# Patient Record
Sex: MALE | Race: WHITE | NOT HISPANIC OR LATINO
[De-identification: names, ages, dates, MRNs, and addresses within clinical notes are randomized per-mention and may not be internally consistent; named-entity substitution may affect disease eponyms.]

---

## 2023-03-26 ENCOUNTER — FORM ENCOUNTER (OUTPATIENT)
Age: 23
End: 2023-03-26

## 2023-03-27 ENCOUNTER — APPOINTMENT (OUTPATIENT)
Dept: MRI IMAGING | Facility: CLINIC | Age: 23
End: 2023-03-27
Payer: COMMERCIAL

## 2023-03-27 ENCOUNTER — APPOINTMENT (OUTPATIENT)
Dept: ORTHOPEDIC SURGERY | Facility: CLINIC | Age: 23
End: 2023-03-27
Payer: COMMERCIAL

## 2023-03-27 VITALS — HEIGHT: 67 IN | BODY MASS INDEX: 31.39 KG/M2 | WEIGHT: 200 LBS

## 2023-03-27 DIAGNOSIS — Z00.00 ENCOUNTER FOR GENERAL ADULT MEDICAL EXAMINATION W/OUT ABNORMAL FINDINGS: ICD-10-CM

## 2023-03-27 DIAGNOSIS — M23.92 UNSPECIFIED INTERNAL DERANGEMENT OF LEFT KNEE: ICD-10-CM

## 2023-03-27 PROCEDURE — 99203 OFFICE O/P NEW LOW 30 MIN: CPT

## 2023-03-27 PROCEDURE — L1833: CPT | Mod: LT

## 2023-03-27 PROCEDURE — L2397: CPT | Mod: LT

## 2023-03-27 PROCEDURE — 73721 MRI JNT OF LWR EXTRE W/O DYE: CPT | Mod: LT

## 2023-03-27 PROCEDURE — 73562 X-RAY EXAM OF KNEE 3: CPT | Mod: LT

## 2023-03-27 NOTE — DISCUSSION/SUMMARY
[de-identified] : 22M here with left knee internal derangement, s/s lateral meniscus tear\par 1) recommend STAT mri L knee to eval further\par 2) cryo, nsaids\par 3) playmaker brace\par 4) fu for mri review

## 2023-03-27 NOTE — PHYSICAL EXAM
[Left] : left knee [NL (0)] : extension 0 degrees [5___] : hamstring 5[unfilled]/5 [Positive] : positive Nargis [] : light touch is intact throughout [TWNoteComboBox7] : flexion 135 degrees

## 2023-03-27 NOTE — HISTORY OF PRESENT ILLNESS
[5] : 5 [1] : 2 [Dull/Aching] : dull/aching [Sharp] : sharp [Throbbing] : throbbing [Tightness] : tightness [Intermittent] : intermittent [Nothing helps with pain getting better] : Nothing helps with pain getting better [de-identified] : 22M here with acute left knee pain since 3/16. He was dancing at a bar in [place] when he felt his knee pop. He states he had immediate pain and swelling. Was unable to ambulate. He was taken to  and prescribed Mobic.  [] : no [FreeTextEntry1] : LT knee [FreeTextEntry5] : SPENSER 22 year old M here for LT knee, onset pain since 03/16/23, pt was clubbing and his knee popped \par he was unable to walk \par pt went to an urgent care in [place] and completed x ray \par pt was prescribed meloxicam \par pt is currently  [de-identified] : 03/16/23 [de-identified] : urgent care [place]  [de-identified] :  x ray

## 2023-03-27 NOTE — IMAGING
[Left] : left knee [AP] : anteroposterior [Lateral] : lateral [Newberg] : skyline [AP Standing] : anteroposterior standing [There are no fractures, subluxations or dislocations. No significant abnormalities are seen] : There are no fractures, subluxations or dislocations. No significant abnormalities are seen

## 2023-03-28 ENCOUNTER — APPOINTMENT (OUTPATIENT)
Dept: ORTHOPEDIC SURGERY | Facility: CLINIC | Age: 23
End: 2023-03-28
Payer: COMMERCIAL

## 2023-03-28 PROCEDURE — 99214 OFFICE O/P EST MOD 30 MIN: CPT

## 2023-03-28 NOTE — PHYSICAL EXAM
[Left] : left knee [NL (0)] : extension 0 degrees [5___] : hamstring 5[unfilled]/5 [Positive] : positive Nargis [] : negative Lachmann [TWNoteComboBox7] : flexion 135 degrees

## 2023-03-28 NOTE — HISTORY OF PRESENT ILLNESS
[5] : 5 [1] : 2 [Dull/Aching] : dull/aching [Sharp] : sharp [Throbbing] : throbbing [Tightness] : tightness [Intermittent] : intermittent [Nothing helps with pain getting better] : Nothing helps with pain getting better [de-identified] : 03/28/23: Here to f/up left knee and review MRI\par 3/27/23: 22M here with acute left knee pain since 3/16. He was dancing at a bar in [place] when he felt his knee pop. He states he had immediate pain and swelling. Was unable to ambulate. He was taken to  and prescribed Mobic.  [] : no [FreeTextEntry1] : LT knee [FreeTextEntry5] : SPENSER 22 year old M here for LT knee, onset pain since 03/16/23, pt was clubbing and his knee popped \par he was unable to walk \par pt went to an urgent care in [place] and completed x ray \par pt was prescribed meloxicam \par pt is currently  [de-identified] : 03/16/23 [de-identified] : urgent care [place]  [de-identified] :  x ray  [de-identified] : MRI

## 2023-03-28 NOTE — DISCUSSION/SUMMARY
[de-identified] : 22m with high-grade proximal tear of the ACL of the left knee. r/b/a of surgical intervention and conservative management discussed. pt given to opportunity to ask all questions and all questions were answered.   Pt would like to proceed with surgery as discussed.\par \par Pt is traveling and will schedule for when he returns in June. \par Will plan for left knee ACL reconstruction. \par \par Continue with playmaker brace. cryotherapy, rest and activity modification \par Pt will f/up in 6 weeks, will further discuss surgery. \par \par Entered by Carmen Horn acting as scribe.\par Dr. Callejas-\par The documentation recorded by the scribe accurately reflects the service I personally performed and the decisions made by me.

## 2023-03-28 NOTE — DATA REVIEWED
[MRI] : MRI [Left] : left [Knee] : knee [Report was reviewed and noted in the chart] : The report was reviewed and noted in the chart [I independently reviewed and interpreted images and report] : I independently reviewed and interpreted images and report [I reviewed the films/CD] : I reviewed the films/CD [FreeTextEntry1] : 03.27.23\par 1. High-grade proximal ACL tear with severe thinning and attenuation proximally and laxity of [id number] \par fibers with severe surrounding synovitis at the femoral attachment.\par 2. Moderate effusion, synovitis and medial synovial plica with slight proximal MCL sprain and slight posterior lateral soft tissue swelling.\par 3. Discoid lateral meniscus without tear.\par 4. No acute osseous injury. Specifically, there are no lateral bone contusions to clearly indicate recent anterior tibial translation episode.\par 5. Clinical correlation regarding anterior instability on physical exam is recommended.

## 2023-03-28 NOTE — IMAGING
[Left] : left knee [AP] : anteroposterior [Lateral] : lateral [Frankewing] : skyline [AP Standing] : anteroposterior standing [There are no fractures, subluxations or dislocations. No significant abnormalities are seen] : There are no fractures, subluxations or dislocations. No significant abnormalities are seen No

## 2023-03-29 ENCOUNTER — APPOINTMENT (OUTPATIENT)
Dept: ORTHOPEDIC SURGERY | Facility: CLINIC | Age: 23
End: 2023-03-29

## 2023-05-12 ENCOUNTER — APPOINTMENT (OUTPATIENT)
Dept: ORTHOPEDIC SURGERY | Facility: CLINIC | Age: 23
End: 2023-05-12
Payer: COMMERCIAL

## 2023-05-12 VITALS — WEIGHT: 200 LBS | BODY MASS INDEX: 31.39 KG/M2 | HEIGHT: 67 IN

## 2023-05-12 PROCEDURE — 99214 OFFICE O/P EST MOD 30 MIN: CPT

## 2023-05-12 NOTE — HISTORY OF PRESENT ILLNESS
[5] : 5 [1] : 2 [Dull/Aching] : dull/aching [Sharp] : sharp [Throbbing] : throbbing [Tightness] : tightness [Intermittent] : intermittent [Nothing helps with pain getting better] : Nothing helps with pain getting better [de-identified] : 5/12/23: Here to f/up left knee ACL tear. Reports episodes of increased knee pain and "popping" and instability. \par 03/28/23: Here to f/up left knee and review MRI\par 3/27/23: 22M here with acute left knee pain since 3/16. He was dancing at a bar in [place] when he felt his knee pop. He states he had immediate pain and swelling. Was unable to ambulate. He was taken to  and prescribed Mobic.  [] : no [FreeTextEntry1] : LT knee [FreeTextEntry5] : Pt feeling improvement in the Lt Knee. Pt using play maker for support. ROM Improving. Walking Improving.  [de-identified] : 03/16/23 [de-identified] : urgent care [place]  [de-identified] :  x ray

## 2023-05-12 NOTE — PHYSICAL EXAM
[Left] : left knee [NL (0)] : extension 0 degrees [5___] : hamstring 5[unfilled]/5 [Positive] : positive Nargis [] : patient ambulates without assistive device [TWNoteComboBox7] : flexion 135 degrees

## 2023-05-12 NOTE — DISCUSSION/SUMMARY
[de-identified] : 22m with high-grade proximal tear of the ACL of the left knee. r/b/a of surgical intervention and conservative management discussed. pt given to opportunity to ask all questions and all questions were answered.   Pt would like to proceed with surgery as discussed.\par \par Pt is traveling and is scheduled for when he returns in June. \par Will plan for left knee ACL reconstruction, btb autograft.\par \par Continue with playmaker brace. cryotherapy, rest and activity modification \par Will f/up prior to surgery and will get him lluvia brace for post-op.\par \par The patient was advised of the diagnosis. The natural history of the pathology was explained in full to the patient in layman's terms. All questions were answered. The risks and benefits of surgical and non-surgical treatment alternatives were explained in full to the patient. \par The patient demonstrated a full understanding of the surgical and non-surgical options. The risks of surgery were outlined in full to the patient including but not limited to bleeding, scarring, infection, sepsis, neurologic injury, vascular injury, failure to resolve symptoms, symptom recurrence, the need for further surgery, non-healing, wound breakdown, deep vein thrombosis, pulmonary embolism, spontaneous osteonecrosis, anesthesia complications and even death. The patient understood all the risks and accepted them and understood that other complications could occur that are not mentioned above. The intraoperative plan, post-operative plan, post-operative expectations and limitations were explained in full. Expectations from non-surgical treatment were explained in full as well. The patient demonstrated a complete understanding of the treatment alternatives and requested the above-mentioned procedure. This will be scheduled accordingly\par  \par The patient was advised of the diagnosis. The natural history of the pathology was explained in full to the patient in layman's terms. All questions were answered. The risks and benefits of surgical and non-surgical treatment alternatives were explained in full to the patient. \par The patient demonstrated a full understanding of the surgical and non-surgical options. The risks of surgery were outlined in full to the patient including but not limited to bleeding, scarring, infection, sepsis, neurologic injury, vascular injury, failure to resolve symptoms, symptom recurrence, the need for further surgery, non-healing, wound breakdown, deep vein thrombosis, pulmonary embolism, spontaneous osteonecrosis, anesthesia complications and even death. \par More specifically, we discussed that there could be injury to the infrapatellar branch of the saphenous nerve, causing permanent numbness near the incision, re-rupture of the ACL, motion loss, persistent pain even after surgery, among other risks. \par The patient understood all the risks and accepted them and understood that other complications could occur that are not mentioned above. The intraoperative plan, post-operative plan, post-operative expectations and limitations were explained in full. Expectations from non-surgical treatment were explained in full as well. The patient demonstrated a complete understanding of the treatment alternatives and requested the above-mentioned procedure. This will be scheduled accordingly.\par  \par Entered by Carmen Horn acting as scribe.\par Dr. Callejas-\par The documentation recorded by the scribe accurately reflects the service I personally performed and the decisions made by me.

## 2023-06-22 ENCOUNTER — APPOINTMENT (OUTPATIENT)
Dept: ORTHOPEDIC SURGERY | Facility: CLINIC | Age: 23
End: 2023-06-22
Payer: COMMERCIAL

## 2023-06-22 VITALS — WEIGHT: 200 LBS | HEIGHT: 67 IN | BODY MASS INDEX: 31.39 KG/M2

## 2023-06-22 PROCEDURE — L1833: CPT | Mod: LT

## 2023-06-22 PROCEDURE — 99214 OFFICE O/P EST MOD 30 MIN: CPT

## 2023-06-22 NOTE — DISCUSSION/SUMMARY
[de-identified] : 22m with high-grade proximal tear of the ACL of the left knee. Now scheduled for left knee ACL recon 06.26.23. All questions answered. \par [employer] Brace was appropriately fitted and dispensed. Pt left the office with brace as stated above. \par rtc as scheduled post-op

## 2023-06-22 NOTE — PHYSICAL EXAM
[Left] : left knee [NL (0)] : extension 0 degrees [5___] : hamstring 5[unfilled]/5 [Positive] : positive Nargis [] : negative Valgus instability [TWNoteComboBox7] : flexion 125 degrees

## 2023-06-22 NOTE — HISTORY OF PRESENT ILLNESS
[1] : 2 [Dull/Aching] : dull/aching [Sharp] : sharp [Throbbing] : throbbing [Tightness] : tightness [Intermittent] : intermittent [Nothing helps with pain getting better] : Nothing helps with pain getting better [3] : 3 [de-identified] : 6/22/23: Here to f/up left knee. Now scheduled for ACL recon on 06.26.23. Reports continued left knee pain and instability. \par 5/12/23: Here to f/up left knee ACL tear. Reports episodes of increased knee pain and "popping" and instability. \par 03/28/23: Here to f/up left knee and review MRI\par 3/27/23: 22M here with acute left knee pain since 3/16. He was dancing at a bar in [place] when he felt his knee pop. He states he had immediate pain and swelling. Was unable to ambulate. He was taken to  and prescribed Mobic.  [] : no [FreeTextEntry1] : LT knee [FreeTextEntry5] : Pt feeling improvement in the Lt Knee. Pt using play maker for support. ROM Improving. Walking Improving.  [de-identified] : 03/16/23 [de-identified] : urgent care [place]  [de-identified] :  x ray

## 2023-06-26 ENCOUNTER — APPOINTMENT (OUTPATIENT)
Dept: ORTHOPEDIC SURGERY | Facility: AMBULATORY SURGERY CENTER | Age: 23
End: 2023-06-26
Payer: COMMERCIAL

## 2023-06-26 DIAGNOSIS — S83.512A SPRAIN OF ANTERIOR CRUCIATE LIGAMENT OF LEFT KNEE, INITIAL ENCOUNTER: ICD-10-CM

## 2023-06-26 PROCEDURE — 29888 ARTHRS AID ACL RPR/AGMNTJ: CPT | Mod: LT

## 2023-06-26 PROCEDURE — 20902 REMOVAL OF BONE FOR GRAFT: CPT | Mod: 59,LT

## 2023-06-26 PROCEDURE — 29881 ARTHRS KNE SRG MNISECTMY M/L: CPT | Mod: AS,59,LT

## 2023-06-26 PROCEDURE — 20902 REMOVAL OF BONE FOR GRAFT: CPT | Mod: AS,59,LT

## 2023-06-26 PROCEDURE — 29881 ARTHRS KNE SRG MNISECTMY M/L: CPT | Mod: 59,LT

## 2023-06-26 PROCEDURE — 29888 ARTHRS AID ACL RPR/AGMNTJ: CPT | Mod: AS,LT

## 2023-06-26 RX ORDER — OXYCODONE 5 MG/1
5 TABLET ORAL
Qty: 48 | Refills: 0 | Status: ACTIVE | COMMUNITY
Start: 2023-06-26 | End: 1900-01-01

## 2023-06-26 RX ORDER — ASPIRIN 325 MG/1
325 TABLET, FILM COATED ORAL DAILY
Qty: 14 | Refills: 0 | Status: ACTIVE | COMMUNITY
Start: 2023-06-26 | End: 1900-01-01

## 2023-06-30 ENCOUNTER — APPOINTMENT (OUTPATIENT)
Dept: ORTHOPEDIC SURGERY | Facility: CLINIC | Age: 23
End: 2023-06-30
Payer: COMMERCIAL

## 2023-06-30 PROCEDURE — 99024 POSTOP FOLLOW-UP VISIT: CPT

## 2023-06-30 NOTE — HISTORY OF PRESENT ILLNESS
[de-identified] : dos: 06.26.23\par 06/30/2023: Pt here for pov1 s/p left knee ACL recon. Doing well. Denies f/c/s. \par \par 6/22/23: Here to f/up left knee. Now scheduled for ACL recon on 06.26.23. Reports continued left knee pain and instability. \par 5/12/23: Here to f/up left knee ACL tear. Reports episodes of increased knee pain and "popping" and instability. \par 03/28/23: Here to f/up left knee and review MRI\par 3/27/23: 22M here with acute left knee pain since 3/16. He was dancing at a bar in [place] when he felt his knee pop. He states he had immediate pain and swelling. Was unable to ambulate. He was taken to  and prescribed Mobic.  [FreeTextEntry1] : Lt Knee [FreeTextEntry5] : PO 1: Lt Knee.

## 2023-06-30 NOTE — DISCUSSION/SUMMARY
[de-identified] : 23m s/p left knee acl recon 06.26.23\par 1) start physical therapy - protocol provided\par 2) reviewed post-op precautions and procedures.\par 3) cryotherapy, rest and activity modification\par 4) c/w lluvia brace, locked in extension - WBAT\par 5) rtc 1 week \par \par Entered by Carmen Horn acting as scribe.\par Dr. Callejas-\par The documentation recorded by the scribe accurately reflects the service I personally performed and the decisions made by me.

## 2023-07-06 ENCOUNTER — APPOINTMENT (OUTPATIENT)
Dept: ORTHOPEDIC SURGERY | Facility: CLINIC | Age: 23
End: 2023-07-06
Payer: COMMERCIAL

## 2023-07-06 VITALS — HEIGHT: 67 IN | BODY MASS INDEX: 31.39 KG/M2 | WEIGHT: 200 LBS

## 2023-07-06 PROCEDURE — 99024 POSTOP FOLLOW-UP VISIT: CPT

## 2023-07-06 NOTE — HISTORY OF PRESENT ILLNESS
[de-identified] : dos: 06.26.23\par 7/6/23: Here for pov2 s/p left knee acl recon. Attending PT. \par 06/30/2023: Pt here for pov1 s/p left knee ACL recon. Doing well. Denies f/c/s. \par \par 6/22/23: Here to f/up left knee. Now scheduled for ACL recon on 06.26.23. Reports continued left knee pain and instability. \par 5/12/23: Here to f/up left knee ACL tear. Reports episodes of increased knee pain and "popping" and instability. \par 03/28/23: Here to f/up left knee and review MRI\par 3/27/23: 22M here with acute left knee pain since 3/16. He was dancing at a bar in [place] when he felt his knee pop. He states he had immediate pain and swelling. Was unable to ambulate. He was taken to  and prescribed Mobic.

## 2023-07-06 NOTE — DISCUSSION/SUMMARY
[de-identified] : 23m s/p left knee acl recon 06.26.23. not yet able to SLR on exam. \par 1) c/w physical therapy - protocol provided\par 2) reviewed post-op precautions and procedures.\par 3) cryotherapy, rest and activity modification\par 4) c/w lluvia brace, locked in extension - WBAT\par 5) rtc 1 week \par \par Entered by Carmen Horn acting as scribe.\par Dr. Callejas-\par The documentation recorded by the scribe accurately reflects the service I personally performed and the decisions made by me.

## 2023-07-13 ENCOUNTER — APPOINTMENT (OUTPATIENT)
Dept: ORTHOPEDIC SURGERY | Facility: CLINIC | Age: 23
End: 2023-07-13
Payer: COMMERCIAL

## 2023-07-13 VITALS — WEIGHT: 200 LBS | HEIGHT: 67 IN | BODY MASS INDEX: 31.39 KG/M2

## 2023-07-13 PROCEDURE — 99024 POSTOP FOLLOW-UP VISIT: CPT

## 2023-07-13 NOTE — PHYSICAL EXAM
[Left] : left knee [NL (0)] : extension 0 degrees [] : patient ambulates without assistive device [TWNoteComboBox7] : flexion 90 degrees

## 2023-07-13 NOTE — HISTORY OF PRESENT ILLNESS
[1] : 2 [de-identified] : dos: 06.26.23\par 7/13/23: Here for pov3 s/p left knee acl recon. Attending PT. Compliant with brace\par 7/6/23: Here for pov2 s/p left knee acl recon. Attending PT. \par 06/30/2023: Pt here for pov1 s/p left knee ACL recon. Doing well. Denies f/c/s. \par \par 6/22/23: Here to f/up left knee. Now scheduled for ACL recon on 06.26.23. Reports continued left knee pain and instability. \par 5/12/23: Here to f/up left knee ACL tear. Reports episodes of increased knee pain and "popping" and instability. \par 03/28/23: Here to f/up left knee and review MRI\par 3/27/23: 22M here with acute left knee pain since 3/16. He was dancing at a bar in [place] when he felt his knee pop. He states he had immediate pain and swelling. Was unable to ambulate. He was taken to  and prescribed Mobic.  [FreeTextEntry1] : Lt Knee [FreeTextEntry5] : Pt here for PO 3 Lt Knee. Pt states pain is minimal. Pt going to physical therapy 3 times a week. Walking Improving. Pt using [place]air brace.  [de-identified] : Physical Therapy

## 2023-07-13 NOTE — DISCUSSION/SUMMARY
[de-identified] : 23m s/p left knee acl recon 06.26.23. \par 1) c/w physical therapy - protocol provided\par 2) reviewed post-op precautions and procedures.\par 3) cryotherapy, rest and activity modification\par 4) c/w lluvia brace, unlocked- WBAT, can take off to sleep\par 5) rtc 3 weeks\par \par Entered by Carmen Horn acting as scribe.\par Dr. Callejas-\par The documentation recorded by the scribe accurately reflects the service I personally performed and the decisions made by me.

## 2023-08-03 ENCOUNTER — APPOINTMENT (OUTPATIENT)
Dept: ORTHOPEDIC SURGERY | Facility: CLINIC | Age: 23
End: 2023-08-03
Payer: COMMERCIAL

## 2023-08-03 VITALS — BODY MASS INDEX: 31.39 KG/M2 | HEIGHT: 67 IN | WEIGHT: 200 LBS

## 2023-08-03 PROCEDURE — 99024 POSTOP FOLLOW-UP VISIT: CPT

## 2023-08-03 NOTE — RETURN TO WORK/SCHOOL
[FreeTextEntry1] : Pt would benefit from a closer parking space for school to reduce his required walking distance.

## 2023-08-03 NOTE — DISCUSSION/SUMMARY
[de-identified] : 23m s/p left knee acl recon 06.26.23.  1) c/w physical therapy and hep  2) reviewed post-op precautions and procedures. 3) cryotherapy, rest and activity modification 4) pt would benefit from wearing a brace for longer walking.  Pt would also benefit from a closer parking space for school to reduce the amount of walking.  5) rtc 6 weeks  Entered by Carmen Horn acting as scribe. Dr. Callejas- The documentation recorded by the scribe accurately reflects the service I personally performed and the decisions made by me.

## 2023-08-03 NOTE — HISTORY OF PRESENT ILLNESS
[1] : 2 [0] : 0 [Throbbing] : throbbing [Constant] : constant [Leisure] : leisure [de-identified] : dos: 06.26.23 8/3/23: Here for pov s/p left knee ACL recon. Attending PT doing well, seeing good improvement.  7/13/23: Here for pov3 s/p left knee acl recon. Attending PT. Compliant with brace 7/6/23: Here for pov2 s/p left knee acl recon. Attending PT.  06/30/2023: Pt here for pov1 s/p left knee ACL recon. Doing well. Denies f/c/s.   6/22/23: Here to f/up left knee. Now scheduled for ACL recon on 06.26.23. Reports continued left knee pain and instability.  5/12/23: Here to f/up left knee ACL tear. Reports episodes of increased knee pain and "popping" and instability.  03/28/23: Here to f/up left knee and review MRI 3/27/23: 22M here with acute left knee pain since 3/16. He was dancing at a bar in [place] when he felt his knee pop. He states he had immediate pain and swelling. Was unable to ambulate. He was taken to  and prescribed Mobic.  [] : no [FreeTextEntry1] : LT knee  [FreeTextEntry5] : Patient is here for PO on the LT knee. Pain decreased. Attends PT 3x a week.  [de-identified] : Physical therapy

## 2023-08-03 NOTE — PHYSICAL EXAM
[Left] : left knee [NL (0)] : extension 0 degrees [] : patient ambulates without assistive device [TWNoteComboBox7] : flexion 125 degrees

## 2023-09-08 ENCOUNTER — APPOINTMENT (OUTPATIENT)
Dept: ORTHOPEDIC SURGERY | Facility: CLINIC | Age: 23
End: 2023-09-08
Payer: COMMERCIAL

## 2023-09-08 VITALS — HEIGHT: 67 IN | WEIGHT: 200 LBS | BODY MASS INDEX: 31.39 KG/M2

## 2023-09-08 PROCEDURE — L1852: CPT | Mod: LT

## 2023-09-08 PROCEDURE — 99024 POSTOP FOLLOW-UP VISIT: CPT

## 2023-09-08 NOTE — PHYSICAL EXAM
[Left] : left knee [NL (0)] : extension 0 degrees [NL (140)] : flexion 140 degrees [] : no extensor lag

## 2023-09-08 NOTE — HISTORY OF PRESENT ILLNESS
[0] : 0 [Throbbing] : throbbing [Constant] : constant [Leisure] : leisure [1] : 2 [de-identified] : dos: 06.26.23 9/8/23: Here for pov s/p left knee ACL recon. Dong well. Attending PT.  8/3/23: Here for pov s/p left knee ACL recon. Attending PT doing well, seeing good improvement.  7/13/23: Here for pov3 s/p left knee acl recon. Attending PT. Compliant with brace 7/6/23: Here for pov2 s/p left knee acl recon. Attending PT.  06/30/2023: Pt here for pov1 s/p left knee ACL recon. Doing well. Denies f/c/s.   6/22/23: Here to f/up left knee. Now scheduled for ACL recon on 06.26.23. Reports continued left knee pain and instability.  5/12/23: Here to f/up left knee ACL tear. Reports episodes of increased knee pain and "popping" and instability.  03/28/23: Here to f/up left knee and review MRI 3/27/23: 22M here with acute left knee pain since 3/16. He was dancing at a bar in [place] when he felt his knee pop. He states he had immediate pain and swelling. Was unable to ambulate. He was taken to  and prescribed Mobic.  [] : no [FreeTextEntry1] : LT knee  [FreeTextEntry5] : Patient is here for PO on the LT knee. No pain feels slight soreness after physical therapy. Attends PT 3x a week.  [de-identified] : Physical therapy

## 2023-09-08 NOTE — DISCUSSION/SUMMARY
[de-identified] : 23m s/p left knee acl recon 06.26.23.  1) c/w physical therapy and hep  2) reviewed post-op precautions and procedures. 3) cryotherapy, rest and activity modification 4) ACL functional Brace was appropriately fitted and dispensed. Pt left the office with brace as stated above.  5) rtc 3 months   Entered by Carmen Horn acting as scribe. Dr. Callejas- The documentation recorded by the scribe accurately reflects the service I personally performed and the decisions made by me.

## 2023-09-08 NOTE — REASON FOR VISIT
[FreeTextEntry2] : 09/08/2023 :SPENSER GUEVARA , a 23 year old male, presents today for left knee s/p surgery

## 2023-12-13 ENCOUNTER — APPOINTMENT (OUTPATIENT)
Dept: ORTHOPEDIC SURGERY | Facility: CLINIC | Age: 23
End: 2023-12-13
Payer: COMMERCIAL

## 2023-12-13 PROCEDURE — 99213 OFFICE O/P EST LOW 20 MIN: CPT

## 2023-12-13 NOTE — DISCUSSION/SUMMARY
[de-identified] : 23m s/p left knee acl recon 06.26.23.  1) c/w pt and hep  2) acl functional brace for increased activity 3) discussed gradual return to and increase with activity as tolerated 4) rtc 3 months   Entered by Carmen Horn acting as scribe. Dr. Callejas- The documentation recorded by the scribe accurately reflects the service I personally performed and the decisions made by me.

## 2023-12-13 NOTE — PHYSICAL EXAM
[Left] : left knee [NL (140)] : flexion 140 degrees [NL (0)] : extension 0 degrees [Negative] : negative Romi's [] : non-antalgic

## 2023-12-13 NOTE — HISTORY OF PRESENT ILLNESS
[de-identified] : dos: 06.26.23 12/13/23: here for f/up left knee, s/p left knee ACL recon. Doing well. Has not been to PT ~1 month due to finals.  9/8/23: Here for pov s/p left knee ACL recon. Dong well. Attending PT.  8/3/23: Here for pov s/p left knee ACL recon. Attending PT doing well, seeing good improvement.  7/13/23: Here for pov3 s/p left knee acl recon. Attending PT. Compliant with brace 7/6/23: Here for pov2 s/p left knee acl recon. Attending PT.  06/30/2023: Pt here for pov1 s/p left knee ACL recon. Doing well. Denies f/c/s.   6/22/23: Here to f/up left knee. Now scheduled for ACL recon on 06.26.23. Reports continued left knee pain and instability.  5/12/23: Here to f/up left knee ACL tear. Reports episodes of increased knee pain and "popping" and instability.  03/28/23: Here to f/up left knee and review MRI 3/27/23: 22M here with acute left knee pain since 3/16. He was dancing at a bar in [place] when he felt his knee pop. He states he had immediate pain and swelling. Was unable to ambulate. He was taken to  and prescribed Mobic.

## 2024-03-13 ENCOUNTER — APPOINTMENT (OUTPATIENT)
Dept: ORTHOPEDIC SURGERY | Facility: CLINIC | Age: 24
End: 2024-03-13

## 2024-03-19 ENCOUNTER — APPOINTMENT (OUTPATIENT)
Dept: ORTHOPEDIC SURGERY | Facility: CLINIC | Age: 24
End: 2024-03-19
Payer: COMMERCIAL

## 2024-03-19 DIAGNOSIS — Z98.890 OTHER SPECIFIED POSTPROCEDURAL STATES: ICD-10-CM

## 2024-03-19 DIAGNOSIS — Z78.9 OTHER SPECIFIED HEALTH STATUS: ICD-10-CM

## 2024-03-19 PROCEDURE — 99213 OFFICE O/P EST LOW 20 MIN: CPT

## 2024-03-19 NOTE — PHYSICAL EXAM
[Left] : left knee [NL (0)] : extension 0 degrees [NL (140)] : flexion 140 degrees [Negative] : negative Romi's [] : non-antalgic

## 2024-03-19 NOTE — HISTORY OF PRESENT ILLNESS
[de-identified] : dos: 06.26.23 3/19/24: Here for f/up left knee. s/p left knee ACL recon. Doing well.  12/13/23: here for f/up left knee, s/p left knee ACL recon. Doing well. Has not been to PT ~1 month due to finals.  9/8/23: Here for pov s/p left knee ACL recon. Dong well. Attending PT.  8/3/23: Here for pov s/p left knee ACL recon. Attending PT doing well, seeing good improvement.  7/13/23: Here for pov3 s/p left knee acl recon. Attending PT. Compliant with brace 7/6/23: Here for pov2 s/p left knee acl recon. Attending PT.  06/30/2023: Pt here for pov1 s/p left knee ACL recon. Doing well. Denies f/c/s.   6/22/23: Here to f/up left knee. Now scheduled for ACL recon on 06.26.23. Reports continued left knee pain and instability.  5/12/23: Here to f/up left knee ACL tear. Reports episodes of increased knee pain and "popping" and instability.  03/28/23: Here to f/up left knee and review MRI 3/27/23: 22M here with acute left knee pain since 3/16. He was dancing at a bar in [place] when he felt his knee pop. He states he had immediate pain and swelling. Was unable to ambulate. He was taken to  and prescribed Mobic.  [FreeTextEntry5] : Follow Up- L Knee. Doing well since last visit. Feels no pain. Played basketball a few days ago without issues.

## 2024-03-19 NOTE — DISCUSSION/SUMMARY
[de-identified] : 23m s/p left knee acl recon 06.26.23.  1) c/w pt and hep  2) acl functional brace for increased activity 3) discussed return to and increase with activity as tolerated 4) rtc 3 months   Entered by Carmen Horn acting as scribe. Dr. Callejas- The documentation recorded by the scribe accurately reflects the service I personally performed and the decisions made by me.

## 2024-06-18 ENCOUNTER — APPOINTMENT (OUTPATIENT)
Dept: ORTHOPEDIC SURGERY | Facility: CLINIC | Age: 24
End: 2024-06-18

## 2025-01-10 ENCOUNTER — APPOINTMENT (OUTPATIENT)
Dept: ORTHOPEDIC SURGERY | Facility: CLINIC | Age: 25
End: 2025-01-10
Payer: COMMERCIAL

## 2025-01-10 VITALS — WEIGHT: 210 LBS | HEIGHT: 67 IN | BODY MASS INDEX: 32.96 KG/M2

## 2025-01-10 DIAGNOSIS — Z98.890 OTHER SPECIFIED POSTPROCEDURAL STATES: ICD-10-CM

## 2025-01-10 DIAGNOSIS — M76.32 ILIOTIBIAL BAND SYNDROME, LEFT LEG: ICD-10-CM

## 2025-01-10 PROCEDURE — 73564 X-RAY EXAM KNEE 4 OR MORE: CPT | Mod: LT

## 2025-01-10 PROCEDURE — 99214 OFFICE O/P EST MOD 30 MIN: CPT

## 2025-05-04 ENCOUNTER — EMERGENCY (EMERGENCY)
Facility: HOSPITAL | Age: 25
LOS: 1 days | End: 2025-05-04
Admitting: EMERGENCY MEDICINE
Payer: COMMERCIAL

## 2025-05-04 VITALS
SYSTOLIC BLOOD PRESSURE: 134 MMHG | RESPIRATION RATE: 15 BRPM | TEMPERATURE: 98 F | DIASTOLIC BLOOD PRESSURE: 80 MMHG | OXYGEN SATURATION: 97 % | HEART RATE: 60 BPM

## 2025-05-04 VITALS
HEIGHT: 67 IN | HEART RATE: 73 BPM | DIASTOLIC BLOOD PRESSURE: 73 MMHG | RESPIRATION RATE: 16 BRPM | OXYGEN SATURATION: 97 % | WEIGHT: 186.95 LBS | SYSTOLIC BLOOD PRESSURE: 112 MMHG | TEMPERATURE: 98 F

## 2025-05-04 DIAGNOSIS — F41.9 ANXIETY DISORDER, UNSPECIFIED: ICD-10-CM

## 2025-05-04 LAB
ALBUMIN SERPL ELPH-MCNC: 4.5 G/DL — SIGNIFICANT CHANGE UP (ref 3.3–5)
ALP SERPL-CCNC: 55 U/L — SIGNIFICANT CHANGE UP (ref 40–120)
ALT FLD-CCNC: 16 U/L — SIGNIFICANT CHANGE UP (ref 4–41)
AMPHET UR-MCNC: NEGATIVE — SIGNIFICANT CHANGE UP
ANION GAP SERPL CALC-SCNC: 13 MMOL/L — SIGNIFICANT CHANGE UP (ref 7–14)
APAP SERPL-MCNC: <10 UG/ML — LOW (ref 15–25)
APPEARANCE UR: CLEAR — SIGNIFICANT CHANGE UP
AST SERPL-CCNC: 17 U/L — SIGNIFICANT CHANGE UP (ref 4–40)
BARBITURATES UR SCN-MCNC: NEGATIVE — SIGNIFICANT CHANGE UP
BASOPHILS # BLD AUTO: 0.04 K/UL — SIGNIFICANT CHANGE UP (ref 0–0.2)
BASOPHILS NFR BLD AUTO: 0.4 % — SIGNIFICANT CHANGE UP (ref 0–2)
BENZODIAZ UR-MCNC: NEGATIVE — SIGNIFICANT CHANGE UP
BILIRUB SERPL-MCNC: 0.3 MG/DL — SIGNIFICANT CHANGE UP (ref 0.2–1.2)
BILIRUB UR-MCNC: NEGATIVE — SIGNIFICANT CHANGE UP
BUN SERPL-MCNC: 9 MG/DL — SIGNIFICANT CHANGE UP (ref 7–23)
CALCIUM SERPL-MCNC: 9.4 MG/DL — SIGNIFICANT CHANGE UP (ref 8.4–10.5)
CHLORIDE SERPL-SCNC: 105 MMOL/L — SIGNIFICANT CHANGE UP (ref 98–107)
CO2 SERPL-SCNC: 24 MMOL/L — SIGNIFICANT CHANGE UP (ref 22–31)
COCAINE METAB.OTHER UR-MCNC: NEGATIVE — SIGNIFICANT CHANGE UP
COLOR SPEC: YELLOW — SIGNIFICANT CHANGE UP
CREAT SERPL-MCNC: 0.79 MG/DL — SIGNIFICANT CHANGE UP (ref 0.5–1.3)
CREATININE URINE RESULT, DAU: 111 MG/DL — SIGNIFICANT CHANGE UP
DIFF PNL FLD: NEGATIVE — SIGNIFICANT CHANGE UP
EGFR: 128 ML/MIN/1.73M2 — SIGNIFICANT CHANGE UP
EGFR: 128 ML/MIN/1.73M2 — SIGNIFICANT CHANGE UP
EOSINOPHIL # BLD AUTO: 0.05 K/UL — SIGNIFICANT CHANGE UP (ref 0–0.5)
EOSINOPHIL NFR BLD AUTO: 0.5 % — SIGNIFICANT CHANGE UP (ref 0–6)
ETHANOL SERPL-MCNC: <10 MG/DL — SIGNIFICANT CHANGE UP
FENTANYL UR QL SCN: NEGATIVE — SIGNIFICANT CHANGE UP
GLUCOSE SERPL-MCNC: 107 MG/DL — HIGH (ref 70–99)
GLUCOSE UR QL: NEGATIVE MG/DL — SIGNIFICANT CHANGE UP
HCT VFR BLD CALC: 41.6 % — SIGNIFICANT CHANGE UP (ref 39–50)
HGB BLD-MCNC: 15.3 G/DL — SIGNIFICANT CHANGE UP (ref 13–17)
IANC: 6.01 K/UL — SIGNIFICANT CHANGE UP (ref 1.8–7.4)
IMM GRANULOCYTES NFR BLD AUTO: 0.2 % — SIGNIFICANT CHANGE UP (ref 0–0.9)
KETONES UR-MCNC: NEGATIVE MG/DL — SIGNIFICANT CHANGE UP
LEUKOCYTE ESTERASE UR-ACNC: NEGATIVE — SIGNIFICANT CHANGE UP
LYMPHOCYTES # BLD AUTO: 2.95 K/UL — SIGNIFICANT CHANGE UP (ref 1–3.3)
LYMPHOCYTES # BLD AUTO: 30 % — SIGNIFICANT CHANGE UP (ref 13–44)
MCHC RBC-ENTMCNC: 30.4 PG — SIGNIFICANT CHANGE UP (ref 27–34)
MCHC RBC-ENTMCNC: 36.8 G/DL — HIGH (ref 32–36)
MCV RBC AUTO: 82.5 FL — SIGNIFICANT CHANGE UP (ref 80–100)
METHADONE UR-MCNC: NEGATIVE — SIGNIFICANT CHANGE UP
MONOCYTES # BLD AUTO: 0.75 K/UL — SIGNIFICANT CHANGE UP (ref 0–0.9)
MONOCYTES NFR BLD AUTO: 7.6 % — SIGNIFICANT CHANGE UP (ref 2–14)
NEUTROPHILS # BLD AUTO: 6.01 K/UL — SIGNIFICANT CHANGE UP (ref 1.8–7.4)
NEUTROPHILS NFR BLD AUTO: 61.3 % — SIGNIFICANT CHANGE UP (ref 43–77)
NITRITE UR-MCNC: NEGATIVE — SIGNIFICANT CHANGE UP
NRBC # BLD AUTO: 0 K/UL — SIGNIFICANT CHANGE UP (ref 0–0)
NRBC # FLD: 0 K/UL — SIGNIFICANT CHANGE UP (ref 0–0)
NRBC BLD AUTO-RTO: 0 /100 WBCS — SIGNIFICANT CHANGE UP (ref 0–0)
OPIATES UR-MCNC: NEGATIVE — SIGNIFICANT CHANGE UP
OXYCODONE UR-MCNC: NEGATIVE — SIGNIFICANT CHANGE UP
PCP SPEC-MCNC: SIGNIFICANT CHANGE UP
PCP UR-MCNC: NEGATIVE — SIGNIFICANT CHANGE UP
PH UR: 6.5 — SIGNIFICANT CHANGE UP (ref 5–8)
PLATELET # BLD AUTO: 225 K/UL — SIGNIFICANT CHANGE UP (ref 150–400)
POTASSIUM SERPL-MCNC: 4.2 MMOL/L — SIGNIFICANT CHANGE UP (ref 3.5–5.3)
POTASSIUM SERPL-SCNC: 4.2 MMOL/L — SIGNIFICANT CHANGE UP (ref 3.5–5.3)
PROT SERPL-MCNC: 7.1 G/DL — SIGNIFICANT CHANGE UP (ref 6–8.3)
PROT UR-MCNC: NEGATIVE MG/DL — SIGNIFICANT CHANGE UP
RBC # BLD: 5.04 M/UL — SIGNIFICANT CHANGE UP (ref 4.2–5.8)
RBC # FLD: 11.9 % — SIGNIFICANT CHANGE UP (ref 10.3–14.5)
SALICYLATES SERPL-MCNC: <0.3 MG/DL — LOW (ref 15–30)
SODIUM SERPL-SCNC: 142 MMOL/L — SIGNIFICANT CHANGE UP (ref 135–145)
SP GR SPEC: 1.01 — SIGNIFICANT CHANGE UP (ref 1–1.03)
THC UR QL: POSITIVE
TOXICOLOGY SCREEN, DRUGS OF ABUSE, SERUM RESULT: SIGNIFICANT CHANGE UP
TSH SERPL-MCNC: 1.92 UIU/ML — SIGNIFICANT CHANGE UP (ref 0.27–4.2)
UROBILINOGEN FLD QL: 0.2 MG/DL — SIGNIFICANT CHANGE UP (ref 0.2–1)
WBC # BLD: 9.82 K/UL — SIGNIFICANT CHANGE UP (ref 3.8–10.5)
WBC # FLD AUTO: 9.82 K/UL — SIGNIFICANT CHANGE UP (ref 3.8–10.5)

## 2025-05-04 PROCEDURE — 99284 EMERGENCY DEPT VISIT MOD MDM: CPT

## 2025-05-04 PROCEDURE — 93010 ELECTROCARDIOGRAM REPORT: CPT

## 2025-05-04 PROCEDURE — 90792 PSYCH DIAG EVAL W/MED SRVCS: CPT | Mod: GC

## 2025-05-04 RX ORDER — HYDROXYZINE HYDROCHLORIDE 25 MG/1
50 TABLET, FILM COATED ORAL ONCE
Refills: 0 | Status: COMPLETED | OUTPATIENT
Start: 2025-05-04 | End: 2025-05-04

## 2025-05-04 RX ORDER — HYDROXYZINE HYDROCHLORIDE 25 MG/1
1 TABLET, FILM COATED ORAL
Qty: 9 | Refills: 0
Start: 2025-05-04 | End: 2025-05-06

## 2025-05-04 RX ADMIN — HYDROXYZINE HYDROCHLORIDE 50 MILLIGRAM(S): 25 TABLET, FILM COATED ORAL at 20:01

## 2025-05-04 NOTE — ED BEHAVIORAL HEALTH ASSESSMENT NOTE - NSBHATTESTTYPEVISIT_PSY_A_CORE
[Normal Growth] : growth [Normal Development] : development [None] : No known medical problems [No Elimination Concerns] : elimination [No Feeding Concerns] : feeding [No Skin Concerns] : skin [Normal Sleep Pattern] : sleep [School] : school [Development and Mental Health] : development and mental health [Nutrition and Physical Activity] : nutrition and physical activity [Oral Health] : oral health [Safety] : safety [No Medications] : ~He/She~ is not on any medications [Patient] : patient Attending with Resident/Fellow/Student

## 2025-05-04 NOTE — ED BEHAVIORAL HEALTH NOTE - BEHAVIORAL HEALTH NOTE
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  was seen by MD and prescribed sertraline (2024).  for which, Pt took briefly - alleviated anxiety symptoms; mother described as "kept him balance".   symptoms improved much so that Pt decided to discontinue sertraline    since about a month ago, the Pt has been increasingly feeling anxiety and depressed.  told mother that he was "not feeling well".  symptoms worsening around a week ago.  stressor: academics - Pt perceived that he is struggling in a subject; had recent finals.  "Pt cannot come to terms having low grades".   mother noted that Pt is depressed and crying intermittently.  associated symptoms include: decreased appetite; sleep disturbances - she bought Pt Unisom.  no SI and no HI.    no manic nor psychotic symptoms endorsed    he has recently been taking (daily) with a therapist, Valeria ([phone]); has no psychiatrist.  no reported psych admissions.  denied any hx of SA     no pertinent medical issues    does smoke cannabis at night which mother reported "helps"; otherwise, no illicit substance use nor any alcohol     mother wants Pt "checked out"; if determined that he is dischargeable, mother will take him home.         [facility]   Reference #: [id number] - NO RECENT CONTROLLED SUBSTANCES PRESCRIBED     Practitioner Count: 0  Pharmacy Count: 0  Current Opioid Prescriptions: 0  Current Benzodiazepine Prescriptions: 0  Current Stimulant Prescriptions: 0      Patient Demographic Information (PDI)       PDI	First Name	Last Name	Birth Date	Gender	Street Address	[facility] Code  KAREN Bueno	2000	Male	[address] [facility]	[address]    Prescription Information      PDI Filter:    PDI	Current Rx	Drug Type	Rx Written	Rx Dispensed	Drug	Quantity	Days Supply	Prescriber Name	Prescriber HERNAN #	Payment Method	Dispenser  A	N	B	08/21/2024	08/23/2024	alprazolam 0.25 mg tablet	14	14	Boni Mcdonald	[id number]	Insurance	[facility] #[id number]  A	N	B	05/30/2024	05/31/2024	alprazolam 0.25 mg tablet	14	14	Boni Mcdonald	[id number]	Insurance	[facility] #[id number]        PSYCKES:    NONE COLLATERAL INFORMATION OBTAINED FROM MOTHER, Henrietta ([phone])  as a child, was shy    currently, in his 2nd yr as a [employer] (on full scholarship)    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  was seen by MD and prescribed sertraline (2024).  for which, Pt took briefly - alleviated anxiety symptoms; mother described as "kept him balance".   symptoms improved much so that Pt decided to discontinue sertraline    since about a month ago, the Pt has been increasingly feeling anxiety and depressed.  told mother that he was "not feeling well".  symptoms worsening around a week ago.  stressor: academics - Pt perceived that he is struggling in a subject; had recent finals.  "Pt cannot come to terms having low grades".   mother noted that Pt is depressed and crying intermittently.  associated symptoms include: decreased appetite; sleep disturbances - she bought Pt Unisom.  no SI and no HI.    no manic nor psychotic symptoms endorsed    he has recently been taking (daily) with a therapist, Valeria ([phone]); has no psychiatrist.  no reported psych admissions.  denied any hx of SA     no pertinent medical issues    does smoke cannabis at night which mother reported "helps"; otherwise, no illicit substance use nor any alcohol     mother wants Pt "checked out"; if determined that he is dischargeable, mother will take him home.         [facility]   Reference #: [id number] - NO RECENT CONTROLLED SUBSTANCES PRESCRIBED     Practitioner Count: 0  Pharmacy Count: 0  Current Opioid Prescriptions: 0  Current Benzodiazepine Prescriptions: 0  Current Stimulant Prescriptions: 0      Patient Demographic Information (PDI)       PDI	First Name	Last Name	Birth Date	Gender	Street Address	[facility] Code  KAREN Bueno	2000	Male	[address]	[facility]	[address]    Prescription Information      PDI Filter:    PDI	Current Rx	Drug Type	Rx Written	Rx Dispensed	Drug	Quantity	Days Supply	Prescriber Name	Prescriber HERNAN #	Payment Method	Dispenser  A	N	B	08/21/2024	08/23/2024	alprazolam 0.25 mg tablet	14	14	Boni Mcdonald	[id number]	Insurance	[facility] #[id number]  A	N	B	05/30/2024	05/31/2024	alprazolam 0.25 mg tablet	14	14	Boni Mcdonald	[id number]	Insurance	[facility] #[id number]        PSYCKES:    NONE        COLLATERAL INFORMATION OBTAINED FROM HIS THERAPIST:   who encouraged Pt to come to the hospital for an evaluation as Pt is currently in "crisis".  described Pt as "closed/ fixed minded"; a "perfectionist".  Pt perceives that "life has no purpose"; that he needed to go to law school in order to have a "sense of purpose".  Pt reportedly has been unhappy his whole life    he has upcoming exams tomorrow, tuesday and wednesday.  he is catastrophizing that he will fail his scheduled wednesday exam.      therapist advocates for psych admission for med mgt and intensive therapy.    - writer discussed with therapist that Pt's current presentation DOES NOT MEET INVOLUNTARY CRITERIA; that he was offered voluntary admission for which, he declined.      ? unclear bipolar and ? psych admission - Pt's father      MOTHER and THERAPIST IS IN AGREEMENT with  ED service's recommendations for out-Pt [facility] follow up this wednesday, 2PM  prescribed atarax 25mg TID PRN x 3 day supply

## 2025-05-04 NOTE — ED PROVIDER NOTE - PATIENT PORTAL LINK FT
You can access the FollowMyHealth Patient Portal offered by [facility] by registering at the following website: [email or web address]. By joining "[employer]’s FollowMyHealth portal, you will also be able to view your health information using other applications (apps) compatible with our system.

## 2025-05-04 NOTE — ED ADULT TRIAGE NOTE - CHIEF COMPLAINT QUOTE
pts mom states he having nervous break down  final are tomorrow. pt suffers depression and anxiety  denies SI and HI

## 2025-05-04 NOTE — ED BEHAVIORAL HEALTH ASSESSMENT NOTE - MEDICATIONS (PRESCRIPTIONS, DIRECTIONS)
3-day supply of hydroxyzine 25 mg TID PRN for acute anxiety sent to pt's pharmacy ([facility] [address] in [place])

## 2025-05-04 NOTE — ED BEHAVIORAL HEALTH ASSESSMENT NOTE - NSBHATTESTCOMMENTATTENDFT_PSY_A_CORE
24/M with no established psych hx apart from reporting that he has hx of depression and anxiety; has no reported hx of psych admissions; no hx of SA and hx of Cannabis use.  denied any pertinent medical issues.  tonight, presented to the ED accompanied by mother upon behest of his therapist due to ongoing anxiety + depression in the context of recent stressors    at this time, is exhibiting symptoms of anxiety predominating over depressive symptoms.  he is noted to be picking his face during this evaluation.  reported experiencing depressive symptoms and intermittent anxiety/panic attacks for yrs.  was taking an SSRI (sertraline per mother) of which, he responded well.  much so that as he was feeling "better", decided to self discontinue.      over the past 1 month then leading up to the last week, symptoms of depression and anxiety have worsened.  despite worsening of sad symptoms, overall functionality has not been severely compromised.  Pt is hyperfocused in the last week re: upcoming law school finals. per therapist, has chronic hx of catastrophizing; views his life as without any purpose.  however, no reported hx of any death wish; currently denied harboring any passive or active SI/ HI.      Pt is not acutely manic; not psychotic.  whilst reported to smoke cannabis, currently does not appear to be intoxicated.  he is not delirious and not catatonic.  differentials to entertain for this case includes: MDD, KRYSTA, panic attacks vs disorder, substance induced anxiety/ mood disorder and axis II pathology complicating primary affective disorder.      current presentation does not satisfy involuntary psych admission.  he was however, offered 9.13 admission for the purpose of med mgt + therapy for which, he declined.  mother and therapist both agreed on Pt's decision with full support.  Pt was able to partake safety planning.  is agreeable towards [facility] appointment this coming5/7/2025 at  2 PM.      Pt was prescribed  hydroxyzine PRN for anxiety.  no other meds prescribed.  mother was instructed not to give xanax (left overs from previous MD) for anxiety if Pt is already given hydroxyzine

## 2025-05-04 NOTE — ED ADULT NURSE NOTE - NSFALLUNIVINTERV_ED_ALL_ED
Bed/Stretcher in lowest position, wheels locked, appropriate side rails in place/Call bell, personal items and telephone in reach/Instruct patient to call for assistance before getting out of bed/chair/stretcher/Non-slip footwear applied when patient is off stretcher/[place] to call system/Physically safe environment - no spills, clutter or unnecessary equipment/Purposeful proactive rounding/Room/bathroom lighting operational, light cord in reach

## 2025-05-04 NOTE — ED BEHAVIORAL HEALTH ASSESSMENT NOTE - RISK ASSESSMENT
Risk factors: cannis use, academic stress, not receiving psychiatric treatment, depressive sx    Protective factors: no current SIIP/HIIP, no h/o SA/SIB, no h/o psych admissions, no access to weapons, good physical health, engaged in school, domiciled, social supports, positive therapeutic relationship, help-seeking behaviors, future-orientation      Overall, pt is at low acute risk of harm and does not meet criteria for psychiatric admission at this time.

## 2025-05-04 NOTE — ED PROVIDER NOTE - OBJECTIVE STATEMENT
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  Admits to insomnia and  racing thoughts caused by multiple social stressors.  Denies SI/HI/AH/VH.  Denies pain, SOB, fever, chills, chest/abdominal discomfort.  Denies falling, punching or kicking  any objects. No evidence of physical injuries, broken skin or  deformities. Denies use of alcohol or  illicit drugs.

## 2025-05-04 NOTE — ED BEHAVIORAL HEALTH ASSESSMENT NOTE - OTHER
CHILO   Reference #: [id number], no recent controlled substance prescribed, see ED BH Note Marks from skin picking school related stressor

## 2025-05-04 NOTE — ED PROVIDER NOTE - CHIEF COMPLAINT
CC: Coco is here for Office Visit (R ankle FU/Using crutches and in a boot /Had x-rays/Doing home exercises/Taking Ibuprofen PRN )  .    Patient would like communication of their results via:    Cell Phone:   Telephone Information:   Mobile [phone]     Okay to leave a message containing results? Yes     ALLERGIES:  No Known Allergies     Tobacco history: verified         The patient is a 24y Male complaining of

## 2025-05-04 NOTE — ED BEHAVIORAL HEALTH ASSESSMENT NOTE - SUMMARY
Patient is a 24 year old male, single, student at [employer], no formal psychiatric history, in therapy with Valeria Morales of [facility], no previous attempts or NSSIB, no prior admissions or aggressive behavior, referred by self to ED for worsening anxiety in the setting of upcoming final tomorrow for [employer] school. On exam, patient is anxious but amenable to interview. Reports years of depressive symptoms and intermittent anxiety/panic sx worsened by academic stressors. Acute worsening of anxiety sx in the last week in the setting of law school finals. Denies SIIP/HIIP and no hx of self-harm. No evidence of nina or psychosis on exam. Declined voluntary admission. Able to contract for safety and engaged in safety planning. [facility] at 2 PM for evaluation for medication management and take home 3-day supply of hydroxyzine for anxiety. In therapy with Valeria Morales who is agreeable with plan. Pt's mother also agreeable with plan and denies any safety concerns, feels comfortable bringing pt home. Provided psychoeducation about how xanax can cause rebound anxiety and worsen sx, pt states he will no longer use it. Declining voluntary admission. At this time, patient does not meet criteria for involuntary psychiatric hospitalization.

## 2025-05-04 NOTE — ED BEHAVIORAL HEALTH ASSESSMENT NOTE - HPI (INCLUDE ILLNESS QUALITY, SEVERITY, DURATION, TIMING, CONTEXT, MODIFYING FACTORS, ASSOCIATED SIGNS AND SYMPTOMS)
Patient is a 24 year old male, single, student at [employer], no formal psychiatric history, in therapy with Valeria Morales of [facility], no previous attempts or NSSIB, no prior admissions or aggressive behavior, referred by self to ED for worsening anxiety in the setting of upcoming final tomorrow for [employer] school.    On exam, patient appears anxious but is calm and cooperative with exam. States that he has been feeling extremely anxious for the last week since he realized that he was struggling with one of his classes that has an upcoming final. Reports 1 week of extreme anxiety, frequent skin picking, an anxiety attack (called is at anxiety seizure), poor sleep, and frequent crying. Describes that anxiety attack as heart racing, mind going blank, crying, and unable to communicate. Reports that he has been depressed since 2020 -- endorsing intermittent anhedonia, poor concentration, feelings of guilt, poor sleep, depressed mood, and low energy. States he got into a relationship this year that he thought would fix things but it has not. Reports that he had a nervous breakdown in 2023 during senior year with similar anxiety sx. States that he has been self-medicating with cannabis (vaping) and xanax (0.25 mg, old script from pediatrician for flight anxiety). Denies SIIP/HIIP. Denies self-harm or previous attempts. Reports he is a social drinker and denies other substance use. Has never had psychiatric treatment with psychiatrist. No prior medication trials. Reports he frequently ruminates about the past, which magnifies his depressed mood.    Denies manic sx. Denies AVH/paranoia. Denies intrusive thoughts/compulsive behavior (outside of skin picking). See ED BH note for collateral from pt's mother and pt's therapist.

## 2025-05-04 NOTE — ED PROVIDER NOTE - CLINICAL SUMMARY MEDICAL DECISION MAKING FREE TEXT BOX
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  Admits to insomnia and  racing thoughts caused by multiple social stressors.  Denies SI/HI/AH/VH.  Denies pain, SOB, fever, chills, chest/abdominal discomfort.  Denies falling, punching or kicking  any objects. No evidence of physical injuries, broken skin or  deformities. Denies use of alcohol or  illicit drugs.      Labs, Urine Tox/UA, EKG  Medical evaluation performed. There is no clinical evidence of intoxication or any acute medical problem requiring immediate intervention. Patient is awaiting psychiatric consultation. Final disposition will be determined by psychiatrist.

## 2025-05-04 NOTE — BH SAFETY PLAN - LOCAL URGENT CARE NAME
[facility]  [address] [address]    Mon-Fri: 9am-5pm

## 2025-05-04 NOTE — ED BEHAVIORAL HEALTH ASSESSMENT NOTE - DESCRIPTION
Student at [employer]. Lives in [place] with his mother Anxious but cooperative in ED  Did not require restraints or 1:1 CO    T(C): 36.7 (05-04-25 @ 18:42), Max: 36.7 (05-04-25 @ 18:42)  HR: 73 (05-04-25 @ 18:42) (73 - 73)  BP: 112/73 (05-04-25 @ 18:42) (112/73 - 112/73)  RR: 16 (05-04-25 @ 18:42) (16 - 16)  SpO2: 97% (05-04-25 @ 18:42) (97% - 97%) denies Student at [employer]. Lives in [place] with his mother.. no reported access to guns

## 2025-05-04 NOTE — ED ADULT NURSE NOTE - OBJECTIVE STATEMENT
A&OX4, ambulatory, phx depression, anxiety. Patient is coming to ED in regards to nervous breakdown. 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 Of note, patient states he smokes marijuana, denies any other drug use, SI, HI,AH,VH. will continue to monitor.

## 2025-05-05 DIAGNOSIS — F32.A DEPRESSION, UNSPECIFIED: ICD-10-CM

## 2025-05-06 ENCOUNTER — OUTPATIENT (OUTPATIENT)
Dept: OUTPATIENT SERVICES | Facility: HOSPITAL | Age: 25
LOS: 1 days | Discharge: ANOTHER HOSPITAL | End: 2025-05-06
Payer: COMMERCIAL

## 2025-05-06 DIAGNOSIS — F41.9 ANXIETY DISORDER, UNSPECIFIED: ICD-10-CM

## 2025-05-06 PROBLEM — Z78.9 OTHER SPECIFIED HEALTH STATUS: Chronic | Status: ACTIVE | Noted: 2025-05-04

## 2025-05-06 PROCEDURE — 90839 PSYTX CRISIS INITIAL 60 MIN: CPT | Mod: 95

## 2025-05-06 PROCEDURE — 99215 OFFICE O/P EST HI 40 MIN: CPT

## 2025-05-06 PROCEDURE — 90839 PSYTX CRISIS INITIAL 60 MIN: CPT
